# Patient Record
Sex: FEMALE | URBAN - METROPOLITAN AREA
[De-identification: names, ages, dates, MRNs, and addresses within clinical notes are randomized per-mention and may not be internally consistent; named-entity substitution may affect disease eponyms.]

---

## 2020-10-01 ENCOUNTER — OFFICE VISIT (OUTPATIENT)
Dept: URBAN - METROPOLITAN AREA CLINIC 76 | Facility: CLINIC | Age: 30
End: 2020-10-01
Payer: COMMERCIAL

## 2020-10-01 DIAGNOSIS — H10.45 OTHER CHRONIC ALLERGIC CONJUNCTIVITIS: ICD-10-CM

## 2020-10-01 PROCEDURE — 92004 COMPRE OPH EXAM NEW PT 1/>: CPT | Performed by: OPTOMETRIST

## 2020-10-01 ASSESSMENT — VISUAL ACUITY
OS: 20/20
OD: 20/20

## 2020-10-01 ASSESSMENT — INTRAOCULAR PRESSURE
OS: 15
OD: 15

## 2020-10-01 ASSESSMENT — KERATOMETRY
OS: 43.63
OD: 43.75

## 2021-10-13 ENCOUNTER — OFFICE VISIT (OUTPATIENT)
Dept: URBAN - METROPOLITAN AREA CLINIC 76 | Facility: CLINIC | Age: 31
End: 2021-10-13
Payer: COMMERCIAL

## 2021-10-13 DIAGNOSIS — H52.13 MYOPIA, BILATERAL: Primary | ICD-10-CM

## 2021-10-13 PROCEDURE — 92012 INTRM OPH EXAM EST PATIENT: CPT | Performed by: OPTOMETRIST

## 2021-10-13 PROCEDURE — 92310 CONTACT LENS FITTING OU: CPT | Performed by: OPTOMETRIST

## 2021-10-13 ASSESSMENT — VISUAL ACUITY
OS: 20/20
OD: 20/20

## 2021-10-13 ASSESSMENT — INTRAOCULAR PRESSURE
OD: 14
OS: 14

## 2021-10-13 ASSESSMENT — KERATOMETRY
OS: 43.63
OD: 43.38

## 2021-10-13 NOTE — IMPRESSION/PLAN
Impression: Myopia, bilateral: H52.13. pt wants to try CLs again. Pt reports difficulty with comfort with soft lenses. Astigmatism OS>OD. Plan: A glasses prescription has been discussed and generated. -Obtain contact lens trials. A contact lens follow-up is needed to evaluate the lenses and change trials or finalize the contact lens prescription.  Pt would prefer to try non-toric first.

## 2021-11-09 ENCOUNTER — TESTING ONLY (OUTPATIENT)
Dept: URBAN - METROPOLITAN AREA CLINIC 76 | Facility: CLINIC | Age: 31
End: 2021-11-09

## 2021-11-09 PROCEDURE — 92310 CONTACT LENS FITTING OU: CPT | Performed by: OPTOMETRIST

## 2021-11-09 NOTE — IMPRESSION/PLAN
Impression: Myopia, bilateral: H52.13. CLs are comfortable but not clear. Uncorrected astigmatism is primary cause of blur. Astigmatism OS>OD. Plan:  -Obtain new contact lens trials. A contact lens follow-up is needed to evaluate the lenses Pt elects to try toric ctl OS only. Not enough astigmatism OD for toric. Consider aspheric design.

## 2021-12-23 ENCOUNTER — TESTING ONLY (OUTPATIENT)
Dept: URBAN - METROPOLITAN AREA CLINIC 76 | Facility: CLINIC | Age: 31
End: 2021-12-23
Payer: COMMERCIAL

## 2021-12-23 PROCEDURE — 92310 CONTACT LENS FITTING OU: CPT | Performed by: OPTOMETRIST

## 2021-12-23 NOTE — IMPRESSION/PLAN
Impression: Myopia, bilateral: H52.13. CL f/u. Good fit/vision. Plan: A contact lens prescription has also been discussed and generated. No changes. Patient to call with any concerns.

## 2022-05-12 ENCOUNTER — TESTING ONLY (OUTPATIENT)
Dept: URBAN - METROPOLITAN AREA CLINIC 76 | Facility: CLINIC | Age: 32
End: 2022-05-12

## 2022-05-12 DIAGNOSIS — H52.13 MYOPIA, BILATERAL: Primary | ICD-10-CM

## 2022-05-12 PROCEDURE — 92310 CONTACT LENS FITTING OU: CPT | Performed by: OPTOMETRIST

## 2022-05-12 NOTE — IMPRESSION/PLAN
Impression: Myopia, bilateral: H52.13. CL f/u pt elects to switch to monthly lenses. Good fit/vision. Plan: A contact lens prescription has also been discussed and generated. Patient to call with any concerns.

## 2024-03-07 ENCOUNTER — OFFICE VISIT (OUTPATIENT)
Dept: URBAN - METROPOLITAN AREA CLINIC 71 | Facility: CLINIC | Age: 34
End: 2024-03-07
Payer: COMMERCIAL

## 2024-03-07 DIAGNOSIS — H52.13 MYOPIA, BILATERAL: Primary | ICD-10-CM

## 2024-03-07 PROCEDURE — 92310 CONTACT LENS FITTING OU: CPT | Performed by: OPTOMETRIST

## 2024-03-07 PROCEDURE — 92012 INTRM OPH EXAM EST PATIENT: CPT | Performed by: OPTOMETRIST

## 2024-03-07 ASSESSMENT — VISUAL ACUITY
OD: 20/20
OS: 20/20

## 2024-03-07 ASSESSMENT — INTRAOCULAR PRESSURE
OS: 22
OD: 21